# Patient Record
Sex: MALE
[De-identification: names, ages, dates, MRNs, and addresses within clinical notes are randomized per-mention and may not be internally consistent; named-entity substitution may affect disease eponyms.]

---

## 2022-12-15 PROBLEM — Z00.00 ENCOUNTER FOR PREVENTIVE HEALTH EXAMINATION: Status: ACTIVE | Noted: 2022-12-15

## 2022-12-16 ENCOUNTER — APPOINTMENT (OUTPATIENT)
Dept: FAMILY MEDICINE | Facility: CLINIC | Age: 27
End: 2022-12-16

## 2022-12-16 VITALS
RESPIRATION RATE: 15 BRPM | TEMPERATURE: 98.2 F | DIASTOLIC BLOOD PRESSURE: 81 MMHG | BODY MASS INDEX: 30.39 KG/M2 | WEIGHT: 178 LBS | HEART RATE: 94 BPM | HEIGHT: 64 IN | OXYGEN SATURATION: 96 % | SYSTOLIC BLOOD PRESSURE: 119 MMHG

## 2022-12-16 DIAGNOSIS — Z78.9 OTHER SPECIFIED HEALTH STATUS: ICD-10-CM

## 2022-12-16 DIAGNOSIS — U07.1 COVID-19: ICD-10-CM

## 2022-12-16 DIAGNOSIS — R05.9 COUGH, UNSPECIFIED: ICD-10-CM

## 2022-12-16 DIAGNOSIS — R10.30 LOWER ABDOMINAL PAIN, UNSPECIFIED: ICD-10-CM

## 2022-12-16 DIAGNOSIS — Z87.09 PERSONAL HISTORY OF OTHER DISEASES OF THE RESPIRATORY SYSTEM: ICD-10-CM

## 2022-12-16 DIAGNOSIS — Z83.49 FAMILY HISTORY OF OTHER ENDOCRINE, NUTRITIONAL AND METABOLIC DISEASES: ICD-10-CM

## 2022-12-16 DIAGNOSIS — Z82.49 FAMILY HISTORY OF ISCHEMIC HEART DISEASE AND OTHER DISEASES OF THE CIRCULATORY SYSTEM: ICD-10-CM

## 2022-12-16 PROCEDURE — 99204 OFFICE O/P NEW MOD 45 MIN: CPT

## 2022-12-16 RX ORDER — FLUTICASONE FUROATE 27.5 UG/1
27.5 SPRAY, METERED NASAL DAILY
Qty: 1 | Refills: 0 | Status: ACTIVE | COMMUNITY
Start: 2022-12-16 | End: 1900-01-01

## 2022-12-16 RX ORDER — BENZONATATE 100 MG/1
100 CAPSULE ORAL
Qty: 21 | Refills: 0 | Status: ACTIVE | COMMUNITY
Start: 2022-12-16 | End: 1900-01-01

## 2022-12-16 RX ORDER — EMTRICITABINE 200 MG/1
200 CAPSULE ORAL DAILY
Qty: 30 | Refills: 0 | Status: ACTIVE | COMMUNITY
Start: 2022-12-16

## 2022-12-16 RX ORDER — GUAIFENESIN 600 MG/1
600 TABLET, EXTENDED RELEASE ORAL
Qty: 1 | Refills: 0 | Status: ACTIVE | COMMUNITY
Start: 2022-12-16 | End: 1900-01-01

## 2022-12-16 RX ORDER — PROMETHAZINE HYDROCHLORIDE AND DEXTROMETHORPHAN HYDROBROMIDE ORAL SOLUTION 15; 6.25 MG/5ML; MG/5ML
6.25-15 SOLUTION ORAL
Qty: 150 | Refills: 0 | Status: ACTIVE | COMMUNITY
Start: 2022-12-16 | End: 1900-01-01

## 2022-12-16 NOTE — PHYSICAL EXAM
[No Acute Distress] : no acute distress [Well Developed] : well developed [Well-Appearing] : well-appearing [Normal Sclera/Conjunctiva] : normal sclera/conjunctiva [EOMI] : extraocular movements intact [Normal Outer Ear/Nose] : the outer ears and nose were normal in appearance [Normal Oropharynx] : the oropharynx was normal [Normal TMs] : both tympanic membranes were normal [No Lymphadenopathy] : no lymphadenopathy [Supple] : supple [No Respiratory Distress] : no respiratory distress  [No Accessory Muscle Use] : no accessory muscle use [Clear to Auscultation] : lungs were clear to auscultation bilaterally [Normal Rate] : normal rate  [Regular Rhythm] : with a regular rhythm [Normal S1, S2] : normal S1 and S2 [No Edema] : there was no peripheral edema [Soft] : abdomen soft [Non-distended] : non-distended [Normal Bowel Sounds] : normal bowel sounds [No CVA Tenderness] : no CVA  tenderness [No Spinal Tenderness] : no spinal tenderness [No Joint Swelling] : no joint swelling [Grossly Normal Strength/Tone] : grossly normal strength/tone [No Rash] : no rash [No Focal Deficits] : no focal deficits [Speech Grossly Normal] : speech grossly normal [Normal Affect] : the affect was normal [Normal Mood] : the mood was normal [Normal Insight/Judgement] : insight and judgment were intact [de-identified] : mild b/l lower abdominal tenderness no rebound or guarding

## 2022-12-16 NOTE — HISTORY OF PRESENT ILLNESS
[FreeTextEntry8] : 28 yo M with hx of bronchitis presents with c/o cough.\par \par Patient states he tested positive for COVID19 3 weeks ago.\par He c/o nasal congestion and throat pain. Dry cough has been present for 4-6 weeks, but he denies fever, chills, chest pain, shortness breath, N/V/D, constipation and melena. He reports chronic intermittent b/l lower abdominal pain, 1/10 in severity, and he states abdominal US in the past was negative. He denies any urinary complaints.\par \par Meds: On PREP daily (emtricitabine)

## 2022-12-16 NOTE — REVIEW OF SYSTEMS
[Fatigue] : fatigue [Sore Throat] : sore throat [Cough] : cough [Negative] : Heme/Lymph [Fever] : no fever [Chills] : no chills [Hot Flashes] : no hot flashes [Night Sweats] : no night sweats [Recent Change In Weight] : ~T no recent weight change [Earache] : no earache [Hearing Loss] : no hearing loss [Nosebleeds] : no nosebleeds [Postnasal Drip] : no postnasal drip [Hoarseness] : no hoarseness [Chest Pain] : no chest pain [Palpitations] : no palpitations [Claudication] : no  leg claudication [Lower Ext Edema] : no lower extremity edema [Orthopena] : no orthopnea [Paroxysmal Nocturnal Dyspnea] : no paroxysmal nocturnal dyspnea [Shortness Of Breath] : no shortness of breath [Wheezing] : no wheezing [Dyspnea on Exertion] : not dyspnea on exertion [FreeTextEntry4] : nasal congestion

## 2022-12-16 NOTE — PLAN
[FreeTextEntry1] : \par \par #Cough 2/2 viral infection\par #COVID19 infection\par -CXR ordered\par -Benzonatate 100 mg TID\par -Flonase QD\par -Mucinex 600 mg BID prn cough/congestion\par -Promethazine-DM q8h \par -Pulmonary evaluation recommended\par \par #Chronic nasal congestion\par -ENT referral\par \par #Lower abdominal pain\par -As per patient US abdomen was negative\par -F/u if no resolution in symptoms \par \par F/u in 6 weeks after Pulmonary evaluation if no improvement in symptoms

## 2022-12-27 ENCOUNTER — APPOINTMENT (OUTPATIENT)
Dept: OTOLARYNGOLOGY | Facility: CLINIC | Age: 27
End: 2022-12-27

## 2022-12-27 VITALS
TEMPERATURE: 96.9 F | OXYGEN SATURATION: 98 % | WEIGHT: 172 LBS | HEART RATE: 97 BPM | HEIGHT: 64 IN | SYSTOLIC BLOOD PRESSURE: 115 MMHG | DIASTOLIC BLOOD PRESSURE: 80 MMHG | BODY MASS INDEX: 29.37 KG/M2

## 2022-12-27 PROCEDURE — 99204 OFFICE O/P NEW MOD 45 MIN: CPT | Mod: 25

## 2022-12-27 PROCEDURE — 31231 NASAL ENDOSCOPY DX: CPT

## 2022-12-27 NOTE — PHYSICAL EXAM
[Nasal Endoscopy Performed] : nasal endoscopy was performed, see procedure section for findings [] : septum deviated bilaterally [Midline] : trachea located in midline position [de-identified] : + Turbinate hypertrophy [de-identified] : clear mucoid [Normal] : no rashes

## 2022-12-27 NOTE — PROCEDURE
[FreeTextEntry6] : -\par Procedure Note\par   \par Pre-operative Diagnosis:  chronic nasal congestion\par Post-operative Diagnosis:   septal deviation, worse on the right side, off of crest, turbinate hypertrophy\par Anesthesia: Topical\par Procedure: Bilateral nasal endoscopy\par   \par Procedure Details: \par After topical anesthesia and decongestant, the patient was placed in the supine position. The telescope was passed along the left nasal floor to the nasopharynx. It was then passed into the region of the middle meatus, middle turbinate, and the sphenoethmoid region.  An identical procedure was performed on the right side. \par   \par Findings: \par Mucosa: 	                normal	\par Nasal septum: 	 right-sided deviation, off of crest	\par Discharge: 	none	\par Turbinates: 	 turbinate hypertrophy\par Adenoid: 	                normal	\par Posterior choanae: 	normal	\par Eustachian tubes: 	normal	\par Mucous stranding: 	normal 	\par Lesions: 	                Not present	\par   \par Comments: \par Condition: Stable. Patient tolerated procedure well.\par Complications: None\par \par

## 2022-12-27 NOTE — HISTORY OF PRESENT ILLNESS
[de-identified] : 12/27/22\par 27yoM referred for nasal blockage, R>L.  Ongoing for last 10 years, improved over the last 5 years after moving to the [place] but worse this year. Wintertime worst for obstruction.  Has some PND, denies rhinorrhea.  Intermittent facial pain 6/10, no facial pressure.  No changes in sense of smell.  Has been using Flonase for the last month which seems to have helped.  Also endorses loud snoring and daytime sleepiness.  Does not wake up during the night.  Father has history of loud snoring and apnea but never formally diagnosed with JAYNA. \par \par Smokes very rarely.  Social EtOH use, 1 drink per week.  No history of recreational drug use including cocaine or marijuana.

## 2022-12-27 NOTE — ASSESSMENT
[FreeTextEntry1] : 27yoM with  concern for significant chronic nasal congestion.  On exam there is evidence of deviated nasal septum R>L -- deviation is off maxillary crest to the right anteriorly and then to the left posteriorly,  as well as turbinate hypertrophy.   at this time I am recommending conservative medical management including nasal saline irrigations as well as nasal steroids.  Patient to follow-up in 6 weeks for repeat evaluation.  Should symptoms persist may benefit from surgical intervention including septoplasty and turbinectomy.\par \par - Saline rinse prior to Flonase\par - Flonase qd\par - RTC 6-8 wks

## 2023-06-05 ENCOUNTER — APPOINTMENT (OUTPATIENT)
Dept: OTOLARYNGOLOGY | Facility: CLINIC | Age: 28
End: 2023-06-05
Payer: COMMERCIAL

## 2023-06-05 VITALS
BODY MASS INDEX: 26.99 KG/M2 | HEIGHT: 65 IN | OXYGEN SATURATION: 98 % | SYSTOLIC BLOOD PRESSURE: 121 MMHG | WEIGHT: 162 LBS | DIASTOLIC BLOOD PRESSURE: 82 MMHG | HEART RATE: 83 BPM | TEMPERATURE: 98.2 F

## 2023-06-05 DIAGNOSIS — J34.3 HYPERTROPHY OF NASAL TURBINATES: ICD-10-CM

## 2023-06-05 DIAGNOSIS — R06.83 SNORING: ICD-10-CM

## 2023-06-05 DIAGNOSIS — J34.2 DEVIATED NASAL SEPTUM: ICD-10-CM

## 2023-06-05 DIAGNOSIS — J30.9 ALLERGIC RHINITIS, UNSPECIFIED: ICD-10-CM

## 2023-06-05 DIAGNOSIS — R09.82 POSTNASAL DRIP: ICD-10-CM

## 2023-06-05 DIAGNOSIS — R09.81 NASAL CONGESTION: ICD-10-CM

## 2023-06-05 DIAGNOSIS — Z91.89 OTHER SPECIFIED PERSONAL RISK FACTORS, NOT ELSEWHERE CLASSIFIED: ICD-10-CM

## 2023-06-05 PROCEDURE — 31231 NASAL ENDOSCOPY DX: CPT | Mod: 52

## 2023-06-05 PROCEDURE — 99215 OFFICE O/P EST HI 40 MIN: CPT | Mod: 25

## 2023-06-05 RX ORDER — FLUTICASONE PROPIONATE 50 UG/1
50 SPRAY, METERED NASAL
Qty: 1 | Refills: 2 | Status: ACTIVE | COMMUNITY
Start: 2023-06-05 | End: 1900-01-01

## 2023-06-05 NOTE — PHYSICAL EXAM
[Nasal Endoscopy Performed] : nasal endoscopy was performed, see procedure section for findings [] : septum deviated to the right [Normal] : mucosa is normal [Midline] : trachea located in midline position [de-identified] : hypertrophy

## 2023-06-05 NOTE — REASON FOR VISIT
[Subsequent Evaluation] : a subsequent evaluation for [FreeTextEntry2] : nasal congestion, PND, snoring

## 2023-06-05 NOTE — ASSESSMENT
[FreeTextEntry1] : 27yoM with concern for significant chronic nasal congestion. On exam there is evidence of deviated nasal septum R>L -- deviation is off maxillary crest to the right anteriorly and then to the left posteriorly, as well as turbinate hypertrophy. at this time I am recommending conservative medical management including nasal saline irrigations as well as nasal steroids. Patient to follow-up in 6 weeks for repeat evaluation. Should symptoms persist may benefit from surgical intervention including septoplasty and turbinectomy.\par \par 6/5/23: Presents with worsening symptoms despite use of nasal saline rinses and nasal steroids. Exam with evidence of allergic rhinitis and known septal deviation with turbinate hypertrophy. At this time, I am recommending consultation with allergist. I am also recommending a polysomnogram to rule out JAYNA. Ultimately, patient may need septoplasty and turbinectomy, we will plan for this in the near future. \par \par - nasal saline irrigation, nasal steroids\par - allergist referral \par - polysomnogram \par - plan for septoplasty and turbinectomy

## 2023-06-05 NOTE — HISTORY OF PRESENT ILLNESS
[de-identified] : 12/27/22\par 27yoM referred for nasal blockage, R>L. Ongoing for last 10 years, improved over the last 5 years after moving to the [place] but worse this year. Wintertime worst for obstruction. Has some PND, denies rhinorrhea. Intermittent facial pain 6/10, no facial pressure. No changes in sense of smell. Has been using Flonase for the last month which seems to have helped. Also endorses loud snoring and daytime sleepiness. Does not wake up during the night. Father has history of loud snoring and apnea but never formally diagnosed with JAYNA. \par \par Smokes very rarely. Social EtOH use, 1 drink per week. No history of recreational drug use including cocaine or marijuana. \par - [FreeTextEntry1] : 6/5/23\par Pt reports persistent symptoms of nasal congestion, PND, snoring. Symptoms worsening with allergy season. Denies facial pressure. No changes in sense of taste or smell. He has been using nasal saline rinses and nasal steroids (PRN). No other ENT issues.

## 2023-06-05 NOTE — PROCEDURE
[FreeTextEntry6] : -\par Procedure Note\par  \par Pre-operative Diagnosis: chronic nasal congestion\par Post-operative Diagnosis: septal deviation, worse on the right side, off of crest, turbinate hypertrophy, allergic rhinitis\par Anesthesia: Topical\par Procedure: Bilateral nasal endoscopy\par  \par Procedure Details: \par After topical anesthesia and decongestant, the patient was placed in the supine position. The telescope was passed along the left nasal floor to the nasopharynx. It was then passed into the region of the middle meatus, middle turbinate, and the sphenoethmoid region. An identical procedure was performed on the right side. \par  \par Findings: \par Mucosa: 	 normal	\par Nasal septum: 	 right-sided deviation, off of crest	\par Discharge: 	none	\par Turbinates: 	 turbinate hypertrophy\par Adenoid: 	 normal	\par Posterior choanae: 	+ cobblestoning\par Eustachian tubes: 	normal	\par Mucous stranding: 	normal 	\par Lesions: 	 Not present	\par  \par Comments: \par Condition: Stable. Patient tolerated procedure well.\par Complications: None\par \par . \par

## 2023-06-30 ENCOUNTER — APPOINTMENT (OUTPATIENT)
Dept: SLEEP CENTER | Facility: HOSPITAL | Age: 28
End: 2023-06-30